# Patient Record
Sex: MALE | Race: OTHER | NOT HISPANIC OR LATINO | URBAN - METROPOLITAN AREA
[De-identification: names, ages, dates, MRNs, and addresses within clinical notes are randomized per-mention and may not be internally consistent; named-entity substitution may affect disease eponyms.]

---

## 2020-10-26 ENCOUNTER — EMERGENCY (EMERGENCY)
Facility: HOSPITAL | Age: 45
LOS: 1 days | Discharge: HOME | End: 2020-10-26
Attending: EMERGENCY MEDICINE
Payer: SELF-PAY

## 2020-10-26 VITALS
SYSTOLIC BLOOD PRESSURE: 131 MMHG | OXYGEN SATURATION: 98 % | WEIGHT: 164.91 LBS | DIASTOLIC BLOOD PRESSURE: 81 MMHG | TEMPERATURE: 98 F | RESPIRATION RATE: 18 BRPM | HEIGHT: 68 IN | HEART RATE: 78 BPM

## 2020-10-26 PROCEDURE — 99283 EMERGENCY DEPT VISIT LOW MDM: CPT

## 2020-10-26 PROCEDURE — 99284 EMERGENCY DEPT VISIT MOD MDM: CPT

## 2020-10-26 RX ORDER — IBUPROFEN 200 MG
1 TABLET ORAL
Qty: 20 | Refills: 0
Start: 2020-10-26

## 2020-10-26 RX ORDER — IBUPROFEN 200 MG
600 TABLET ORAL ONCE
Refills: 0 | Status: COMPLETED | OUTPATIENT
Start: 2020-10-26 | End: 2020-10-26

## 2020-10-26 RX ORDER — METHOCARBAMOL 500 MG/1
1 TABLET, FILM COATED ORAL
Qty: 15 | Refills: 0
Start: 2020-10-26

## 2020-10-26 RX ADMIN — Medication 600 MILLIGRAM(S): at 18:04
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
## 2020-10-26 NOTE — ED PROVIDER NOTE - PATIENT PORTAL LINK FT
You can access the FollowMyHealth Patient Portal offered by [facility] by registering at the following website: [email or web address]. By joining [facility]’s FollowMyHealth portal, you will also be able to view your health information using other applications (apps) compatible with our system.

## 2020-10-26 NOTE — ED PROVIDER NOTE - NS ED ROS FT
Constitutional: no fever, no chills  Eyes: no vision changes  ENT: no nasal congestion, no sore throat  CV: no chest pain  Resp: no cough, no shortness of breath  GI: no abdominal pain, no vomiting, no diarrhea  : no dysuria  MSK: +back pain  Skin: no rash  Neuro: no headache, no weakness, no paresthesias
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
## 2020-10-26 NOTE — ED PROVIDER NOTE - PHYSICAL EXAMINATION
Constitutional: Awake, alert, in no acute distress  Eyes: no scleral icterus  HENT: no scalp tenderness or deformity, no facial tenderness  Neck: no cervical spine tenderness, no palpable stepoff.  Airway patent, no tracheal deviation  CV: no chest wall tenderness, no crepitus or subcutaneous emphysema.  RRR, no murmur  Pulm: non-labored respirations, CTAB  Abdomen: soft, non-tender, non-distended, no ecchymosis  Back: +mild b/l paralumbar tenderness, no midline spinal tenderness, no palpable stepoff  Extremities: stable pelvis, no extremity tenderness or deformity  Skin: no rash  Neuro: AAOx3, GCS 15, moving all extremities equally, no focal neurologic deficit, stable gait
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
## 2025-03-29 ENCOUNTER — EMERGENCY (EMERGENCY)
Facility: HOSPITAL | Age: 50
LOS: 1 days | Discharge: HOME | End: 2025-03-29
Attending: EMERGENCY MEDICINE
Payer: SELF-PAY

## 2025-03-29 VITALS
WEIGHT: 153 LBS | HEART RATE: 85 BPM | DIASTOLIC BLOOD PRESSURE: 78 MMHG | RESPIRATION RATE: 20 BRPM | TEMPERATURE: 98 F | SYSTOLIC BLOOD PRESSURE: 117 MMHG | HEIGHT: 68 IN | OXYGEN SATURATION: 98 %

## 2025-03-29 PROBLEM — Z78.9 OTHER SPECIFIED HEALTH STATUS: Chronic | Status: ACTIVE | Noted: 2020-10-26

## 2025-03-29 LAB
ALP SERPL-CCNC: 39 U/L — LOW (ref 40–120)
ALT FLD-CCNC: 11 U/L — SIGNIFICANT CHANGE UP
ANION GAP SERPL CALC-SCNC: 11 MMOL/L — SIGNIFICANT CHANGE UP (ref 5–17)
AST SERPL-CCNC: 17 U/L — SIGNIFICANT CHANGE UP
BASOPHILS # BLD AUTO: 0.02 K/UL — SIGNIFICANT CHANGE UP (ref 0–0.2)
BASOPHILS NFR BLD AUTO: 0.4 % — SIGNIFICANT CHANGE UP (ref 0–2)
BILIRUB SERPL-MCNC: 0.5 MG/DL — SIGNIFICANT CHANGE UP (ref 0.4–2)
BUN SERPL-MCNC: 12.3 MG/DL — SIGNIFICANT CHANGE UP (ref 8–20)
CALCIUM SERPL-MCNC: 9 MG/DL — SIGNIFICANT CHANGE UP (ref 8.4–10.5)
CHLORIDE SERPL-SCNC: 101 MMOL/L — SIGNIFICANT CHANGE UP (ref 96–108)
CO2 SERPL-SCNC: 27 MMOL/L — SIGNIFICANT CHANGE UP (ref 22–29)
CREAT SERPL-MCNC: 1.05 MG/DL — SIGNIFICANT CHANGE UP (ref 0.5–1.3)
EGFR: 88 ML/MIN/1.73M2 — SIGNIFICANT CHANGE UP
EGFR: 88 ML/MIN/1.73M2 — SIGNIFICANT CHANGE UP
EOSINOPHIL # BLD AUTO: 0.06 K/UL — SIGNIFICANT CHANGE UP (ref 0–0.5)
EOSINOPHIL NFR BLD AUTO: 1.3 % — SIGNIFICANT CHANGE UP (ref 0–6)
GLUCOSE SERPL-MCNC: 92 MG/DL — SIGNIFICANT CHANGE UP (ref 70–99)
HGB BLD-MCNC: 13.1 G/DL — SIGNIFICANT CHANGE UP (ref 13–17)
HIV 1 & 2 AB SERPL IA.RAPID: SIGNIFICANT CHANGE UP
IMM GRANULOCYTES # BLD AUTO: 0.01 K/UL — SIGNIFICANT CHANGE UP (ref 0–0.07)
IMM GRANULOCYTES NFR BLD AUTO: 0.2 % — SIGNIFICANT CHANGE UP (ref 0–0.9)
LYMPHOCYTES # BLD AUTO: 1.96 K/UL — SIGNIFICANT CHANGE UP (ref 1–3.3)
LYMPHOCYTES NFR BLD AUTO: 41.4 % — SIGNIFICANT CHANGE UP (ref 13–44)
MCHC RBC-ENTMCNC: 25 PG — LOW (ref 27–34)
MCHC RBC-ENTMCNC: 31 G/DL — LOW (ref 32–36)
MCV RBC AUTO: 80.7 FL — SIGNIFICANT CHANGE UP (ref 80–100)
MONOCYTES # BLD AUTO: 0.35 K/UL — SIGNIFICANT CHANGE UP (ref 0–0.9)
MONOCYTES NFR BLD AUTO: 7.4 % — SIGNIFICANT CHANGE UP (ref 2–14)
NEUTROPHILS # BLD AUTO: 2.33 K/UL — SIGNIFICANT CHANGE UP (ref 1.8–7.4)
NEUTROPHILS NFR BLD AUTO: 49.3 % — SIGNIFICANT CHANGE UP (ref 43–77)
NRBC # BLD AUTO: 0 K/UL — SIGNIFICANT CHANGE UP (ref 0–0)
NRBC BLD AUTO-RTO: 0 /100 WBCS — SIGNIFICANT CHANGE UP (ref 0–0)
PLATELET # BLD AUTO: 201 K/UL — SIGNIFICANT CHANGE UP (ref 150–400)
PMV BLD: 9.5 FL — SIGNIFICANT CHANGE UP (ref 7–13)
POTASSIUM SERPL-MCNC: 4.3 MMOL/L — SIGNIFICANT CHANGE UP (ref 3.5–5.3)
POTASSIUM SERPL-SCNC: 4.3 MMOL/L — SIGNIFICANT CHANGE UP (ref 3.5–5.3)
PROT SERPL-MCNC: 7.2 G/DL — SIGNIFICANT CHANGE UP (ref 6.6–8.7)
RBC # BLD: 5.24 M/UL — SIGNIFICANT CHANGE UP (ref 4.2–5.8)
RBC # FLD: 13.6 % — SIGNIFICANT CHANGE UP (ref 10.3–14.5)
SODIUM SERPL-SCNC: 139 MMOL/L — SIGNIFICANT CHANGE UP (ref 135–145)
WBC # BLD: 4.73 K/UL — SIGNIFICANT CHANGE UP (ref 3.8–10.5)
WBC # FLD AUTO: 4.73 K/UL — SIGNIFICANT CHANGE UP (ref 3.8–10.5)

## 2025-03-29 PROCEDURE — 36415 COLL VENOUS BLD VENIPUNCTURE: CPT

## 2025-03-29 PROCEDURE — 99284 EMERGENCY DEPT VISIT MOD MDM: CPT

## 2025-03-29 PROCEDURE — 85025 COMPLETE CBC W/AUTO DIFF WBC: CPT

## 2025-03-29 PROCEDURE — 80074 ACUTE HEPATITIS PANEL: CPT

## 2025-03-29 PROCEDURE — 86703 HIV-1/HIV-2 1 RESULT ANTBDY: CPT

## 2025-03-29 PROCEDURE — 99283 EMERGENCY DEPT VISIT LOW MDM: CPT

## 2025-03-29 PROCEDURE — 80053 COMPREHEN METABOLIC PANEL: CPT
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
## 2025-03-29 NOTE — ED PROVIDER NOTE - NSFOLLOWUPINSTRUCTIONS_ED_ALL_ED_FT
NEEDLE EXPOSURE     What should I know about blood and body fluid exposure?    Blood and other body fluids (such as saliva or urine) can carry viruses and other germs that could infect you if you are exposed to them. This is most often a problem for people who work in hospitals or other health care settings.    The most serious infections to be aware of include HIV and hepatitis B and C.    How do I know if I have been "exposed" in a way that could infect me?    Contact with blood, tissue, or other body fluids from an infected person can be risky if it involves:    ?A needlestick or a sharp object that breaks your skin    ?Contact with "mucous membranes" – These are the pink, moist tissues that line the mouth, nose, eyes, and other body parts.    ?Contact with parts of your skin that have cuts or scrapes that are still bleeding    ?Contact with concentrated forms of a virus, such as those in a research lab    Not all body fluids are equally dangerous. In general, blood has more viruses than other body fluids.    What should I do first?    Clean yourself off as soon as possible, then get medical care.    ?If you got blood or other fluids on your skin – Healthy skin is actually very good at protecting you from infection. Even so, if you get blood or body fluids on your skin, wash the exposed area with soap and water. If you have any cuts or scrapes on your skin, put alcohol or another disinfectant on that area after washing.    ?If you got blood or other fluids in your mouth, nose, or eyes – If you got blood or body fluids in your mouth or nose, rinse with lots of water. If you got blood or body fluids in your eyes, rinse with water or a saltwater solution called "saline."    ?If you were stuck by a needle – If your exposure involved something that broke the skin, such as a needle or surgical tool, wash with soap and water. But do not squeeze or pinch the area to try to get anything out.    Who should I talk to?    Any time you are exposed to blood or body fluids at work, talk to your boss or someone in the "occupational health department" of your job. You might need to fill out a report, and you probably need to see a doctor.    If you cannot speak to someone at work right away, or if it happened outside of work, call your own doctor right away. It is very important to see a doctor as soon as possible after an exposure. If you can't reach your doctor or nurse, visit an urgent care clinic or go to the emergency department.    What will the doctor do?    The doctor will test your own blood to see if you already have any infections you were not aware of before. They will also get as many details as possible about the type of exposure you had. For example, if you had a needlestick, the doctor will ask if you:    ?Know the size of the needle    ?Bled after the needlestick    ?Saw blood in the needle or syringe before it stuck you    The doctor will also try to identify the person whose blood or fluids you were exposed to. That way, they can try to find out if they have any known infections, such as HIV. If the person has never been tested for HIV or hepatitis, they will be asked if they are willing to be tested. Testing the person can help the doctor figure out which treatments you might need. For example, if a test shows the person does not have HIV, you do not need any preventive treatment for HIV.    The doctor will also explain how likely it is you could get infected, based on the type of exposure. For example, a needlestick from a needle that was used on a person with hepatitis C would be much more concerning than a splash of urine on your skin from the same person.    What if I am pregnant?    If you are pregnant or there is any chance you could be pregnant, tell the doctor. Your exposure might need to be handled differently.    What if I might have been exposed to HIV?    If you were definitely or possibly exposed to HIV, the doctor can give you the option of taking medicines to reduce the chance you will get infected with HIV. These are the same medicines used to treat HIV. If you decide to take these medicines, you need to start them as soon as possible and take them for 1 month. Talk to the doctor about the risks and benefits of these medicines for you.    What if I might have been exposed to hepatitis B?    If you were definitely or possibly exposed to hepatitis B, the doctor will want to know if and when you had the hepatitis B vaccine. The treatment you need depends on whether you had the vaccine and how well the vaccine worked for you. If you were tested in the past and your doctor knows the vaccine worked, nothing else needs to be done.    ?If you do not know how well the vaccine worked for you, the doctor might run a blood test to find out how well your body responded. Sometimes, the doctor might choose to give you another dose of the vaccine before the blood test comes back. However, if your blood tests show good protection, you will not need any other treatment to prevent hepatitis B.    ?If you got the hepatitis B vaccine in the past, but you do not have good protection, you will need to get the vaccine again. Sometimes, the levels of protection can go down over time. But a simple "booster" shot can bring protection back up. You might also need a shot of "hepatitis B immune globulin." This medicine can help prevent hepatitis B infection.    ?If you never got the hepatitis B vaccine, now is a good time to get it. It can still protect you from infection, even after exposure. You might also need hepatitis B immune globulin. This will help protect you until the vaccine starts to work.    What if I might have been exposed to hepatitis C?    If you were definitely or possibly exposed to hepatitis C, it's important to see the doctor to find out more about your risk. There is no vaccine or medicine to prevent hepatitis C infection. But the doctor can help by watching you closely for the first signs of infection. If you become infected with hepatitis C, there is a good chance your body will fight off the infection on its own. But if not, treatment with certain medicines can cure most hepatitis C infections.    What happens in the weeks after a blood or body fluid exposure?    The doctor will test you again to see if you got the infection or infections they are worried about. These tests might be done several times during the months after the exposure. The exact timing depends on the type of test. If it turns out you did get an infection, there are treatments you can get.    In the weeks after exposure, look for symptoms of HIV infection, such as:    ?Fever    ?Swollen glands    ?Sore throat    ?Sores in the mouth, penis, anus, or vagina    ?Muscle and joint pain    ?Headache    ?Weight loss    Also look for symptoms of hepatitis, which include:    ?Belly pain on the right side    ?Nausea or vomiting    ?Feeling tired    ?Loss of appetite    ?Eyes turning yellow    ?Diarrhea    If you notice any symptoms that worry you, call your doctor. NEEDLE EXPOSURE     What should I know about blood and body fluid exposure?    Blood and other body fluids (such as saliva or urine) can carry viruses and other germs that could infect you if you are exposed to them. This is most often a problem for people who work in hospitals or other health care settings.    The most serious infections to be aware of include HIV and hepatitis B and C.    How do I know if I have been "exposed" in a way that could infect me?    Contact with blood, tissue, or other body fluids from an infected person can be risky if it involves:    ?A needlestick or a sharp object that breaks your skin    ?Contact with "mucous membranes" – These are the pink, moist tissues that line the mouth, nose, eyes, and other body parts.    ?Contact with parts of your skin that have cuts or scrapes that are still bleeding    ?Contact with concentrated forms of a virus, such as those in a research lab    Not all body fluids are equally dangerous. In general, blood has more viruses than other body fluids.    What should I do first?    Clean yourself off as soon as possible, then get medical care.    ?If you got blood or other fluids on your skin – Healthy skin is actually very good at protecting you from infection. Even so, if you get blood or body fluids on your skin, wash the exposed area with soap and water. If you have any cuts or scrapes on your skin, put alcohol or another disinfectant on that area after washing.    ?If you got blood or other fluids in your mouth, nose, or eyes – If you got blood or body fluids in your mouth or nose, rinse with lots of water. If you got blood or body fluids in your eyes, rinse with water or a saltwater solution called "saline."    ?If you were stuck by a needle – If your exposure involved something that broke the skin, such as a needle or surgical tool, wash with soap and water. But do not squeeze or pinch the area to try to get anything out.    Who should I talk to?    Any time you are exposed to blood or body fluids at work, talk to your boss or someone in the "occupational health department" of your job. You might need to fill out a report, and you probably need to see a doctor.    If you cannot speak to someone at work right away, or if it happened outside of work, call your own doctor right away. It is very important to see a doctor as soon as possible after an exposure. If you can't reach your doctor or nurse, visit an urgent care clinic or go to the emergency department.    What will the doctor do?    The doctor will test your own blood to see if you already have any infections you were not aware of before. They will also get as many details as possible about the type of exposure you had. For example, if you had a needlestick, the doctor will ask if you:    ?Know the size of the needle    ?Bled after the needlestick    ?Saw blood in the needle or syringe before it stuck you    The doctor will also try to identify the person whose blood or fluids you were exposed to. That way, they can try to find out if they have any known infections, such as HIV. If the person has never been tested for HIV or hepatitis, they will be asked if they are willing to be tested. Testing the person can help the doctor figure out which treatments you might need. For example, if a test shows the person does not have HIV, you do not need any preventive treatment for HIV.    The doctor will also explain how likely it is you could get infected, based on the type of exposure. For example, a needlestick from a needle that was used on a person with hepatitis C would be much more concerning than a splash of urine on your skin from the same person.    What if I am pregnant?    If you are pregnant or there is any chance you could be pregnant, tell the doctor. Your exposure might need to be handled differently.    What if I might have been exposed to HIV?    If you were definitely or possibly exposed to HIV, the doctor can give you the option of taking medicines to reduce the chance you will get infected with HIV. These are the same medicines used to treat HIV. If you decide to take these medicines, you need to start them as soon as possible and take them for 1 month. Talk to the doctor about the risks and benefits of these medicines for you.    What if I might have been exposed to hepatitis B?    If you were definitely or possibly exposed to hepatitis B, the doctor will want to know if and when you had the hepatitis B vaccine. The treatment you need depends on whether you had the vaccine and how well the vaccine worked for you. If you were tested in the past and your doctor knows the vaccine worked, nothing else needs to be done.    ?If you do not know how well the vaccine worked for you, the doctor might run a blood test to find out how well your body responded. Sometimes, the doctor might choose to give you another dose of the vaccine before the blood test comes back. However, if your blood tests show good protection, you will not need any other treatment to prevent hepatitis B.    ?If you got the hepatitis B vaccine in the past, but you do not have good protection, you will need to get the vaccine again. Sometimes, the levels of protection can go down over time. But a simple "booster" shot can bring protection back up. You might also need a shot of "hepatitis B immune globulin." This medicine can help prevent hepatitis B infection.    ?If you never got the hepatitis B vaccine, now is a good time to get it. It can still protect you from infection, even after exposure. You might also need hepatitis B immune globulin. This will help protect you until the vaccine starts to work.    What if I might have been exposed to hepatitis C?    If you were definitely or possibly exposed to hepatitis C, it's important to see the doctor to find out more about your risk. There is no vaccine or medicine to prevent hepatitis C infection. But the doctor can help by watching you closely for the first signs of infection. If you become infected with hepatitis C, there is a good chance your body will fight off the infection on its own. But if not, treatment with certain medicines can cure most hepatitis C infections.    What happens in the weeks after a blood or body fluid exposure?    The doctor will test you again to see if you got the infection or infections they are worried about. These tests might be done several times during the months after the exposure. The exact timing depends on the type of test. If it turns out you did get an infection, there are treatments you can get.    In the weeks after exposure, look for symptoms of HIV infection, such as:    ?Fever    ?Swollen glands    ?Sore throat    ?Sores in the mouth, penis, anus, or vagina    ?Muscle and joint pain    ?Headache    ?Weight loss    Also look for symptoms of hepatitis, which include:    ?Belly pain on the right side    ?Nausea or vomiting    ?Feeling tired    ?Loss of appetite    ?Eyes turning yellow    ?Diarrhea    If you notice any symptoms that worry you, call your doctor.    Please follow up with your primary care doctor within 3 days    Please take medications as directed
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
## 2025-03-29 NOTE — ED PROVIDER NOTE - PATIENT PORTAL LINK FT
You can access the FollowMyHealth Patient Portal offered by [facility] by registering at the following website: [email or web address]. By joining [employer]’s FollowMyHealth portal, you will also be able to view your health information using other applications (apps) compatible with our system.

## 2025-03-29 NOTE — ED PROVIDER NOTE - PHYSICAL EXAMINATION
Gen: No acute distress, non toxic male, speaking in full sentences   HEENT: NCAT, Mucous membranes moist   Eyes: pink conjunctivae, EOMI, PERRL  CV: RRR, nl s1/s2 noted   Resp: CTAB, normal rate and effort  GI: Abdomen soft, NT, ND. No rebound, no guarding  Neuro: A&O x 3, sensorimotor intact without deficits   MSK: No spine or joint tenderness to palpation, Full ROM ext x 4  Skin: Very small pinpoint to the right middle distal finger. No active bleeding   Ambulatory in the ED
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
## 2025-03-30 LAB
HAV IGM SER-ACNC: SIGNIFICANT CHANGE UP
HBV CORE IGM SER-ACNC: SIGNIFICANT CHANGE UP
HBV SURFACE AG SER-ACNC: SIGNIFICANT CHANGE UP
HCV AB S/CO SERPL IA: 0.18 S/CO — SIGNIFICANT CHANGE UP (ref 0–0.79)
HCV AB SERPL-IMP: SIGNIFICANT CHANGE UP